# Patient Record
Sex: MALE
[De-identification: names, ages, dates, MRNs, and addresses within clinical notes are randomized per-mention and may not be internally consistent; named-entity substitution may affect disease eponyms.]

---

## 2024-01-04 PROBLEM — Z00.00 ENCOUNTER FOR PREVENTIVE HEALTH EXAMINATION: Status: ACTIVE | Noted: 2024-01-04

## 2024-01-08 ENCOUNTER — APPOINTMENT (OUTPATIENT)
Dept: OTOLARYNGOLOGY | Facility: CLINIC | Age: 59
End: 2024-01-08
Payer: COMMERCIAL

## 2024-01-08 VITALS
DIASTOLIC BLOOD PRESSURE: 78 MMHG | SYSTOLIC BLOOD PRESSURE: 127 MMHG | BODY MASS INDEX: 35.46 KG/M2 | HEIGHT: 68 IN | TEMPERATURE: 97.3 F | WEIGHT: 234 LBS | HEART RATE: 66 BPM

## 2024-01-08 DIAGNOSIS — K21.9 GASTRO-ESOPHAGEAL REFLUX DISEASE W/OUT ESOPHAGITIS: ICD-10-CM

## 2024-01-08 DIAGNOSIS — Z86.79 PERSONAL HISTORY OF OTHER DISEASES OF THE CIRCULATORY SYSTEM: ICD-10-CM

## 2024-01-08 DIAGNOSIS — Z78.9 OTHER SPECIFIED HEALTH STATUS: ICD-10-CM

## 2024-01-08 DIAGNOSIS — R49.0 DYSPHONIA: ICD-10-CM

## 2024-01-08 DIAGNOSIS — R05.3 CHRONIC COUGH: ICD-10-CM

## 2024-01-08 PROCEDURE — 99205 OFFICE O/P NEW HI 60 MIN: CPT | Mod: 25

## 2024-01-08 PROCEDURE — 70371 SPEECH EVALUATION COMPLEX: CPT | Mod: 59

## 2024-01-08 PROCEDURE — 31579 LARYNGOSCOPY TELESCOPIC: CPT

## 2024-01-08 RX ORDER — FAMOTIDINE 20 MG/1
20 TABLET, FILM COATED ORAL
Qty: 30 | Refills: 3 | Status: ACTIVE | COMMUNITY
Start: 2024-01-08 | End: 1900-01-01

## 2024-01-08 RX ORDER — BENZONATATE 100 MG/1
100 CAPSULE ORAL 3 TIMES DAILY
Qty: 30 | Refills: 0 | Status: ACTIVE | COMMUNITY
Start: 2024-01-08 | End: 1900-01-01

## 2024-01-08 RX ORDER — OMEPRAZOLE 40 MG/1
40 CAPSULE, DELAYED RELEASE ORAL
Qty: 30 | Refills: 3 | Status: ACTIVE | COMMUNITY
Start: 2024-01-08 | End: 1900-01-01

## 2024-01-08 NOTE — PHYSICAL EXAM
[FreeTextEntry1] : Intermittent coughing fits, gravelly quality to voice with decreased clarity [Normal] : mucosa is normal [Midline] : trachea located in midline position

## 2024-01-08 NOTE — ASSESSMENT
[FreeTextEntry1] : - 1/8/2024 57-year-old gentleman who presents with concern for chronic cough. Based on history and physical exam, I do believe there is a component of laryngopharyngeal reflux.  At this time I am recommending dietary and behavioral change to reduce acid in the diet.  I am also recommending omeprazole as well famotidine for a 3 months trial.  We also had a lengthy discussion regarding cough avoidance as well as increased hydration and sips of water throughout the day.  Will also prescribe Tessalon Perles to help start this process over the next 2 weeks.  In terms of dysphonia there was evidence of supraglottic compression.  I believe that he would also benefit from consultation with our speech pathology team.  -Tessalon Perles -Consultation with speech pathology - Dietary and behavioral modification to reduce acid reflux, handout given - Omeprazole qd as well as Famotidine qhs - Voice hygiene, increase hydration, sips of water throughout the day, avoid throat clearing - fu 3 mo

## 2024-01-08 NOTE — PROCEDURE
[de-identified] : - Procedure Note   Pre-operative Diagnosis: Dysphonia, Throat discomfort Post-operative Diagnosis:  laryngopharyngeal reflux Anesthesia: Topical - 1 % Lidocaine/Phenylephrine Procedure:  Flexible Laryngoscopy with Stroboscopy - [facility] [id number]   Procedure Details:  The patient was placed in the sitting position.  After decongestant and anesthesia were applied the laryngoscope was passed.  The nasal cavities, nasopharynx, oropharynx, hypopharynx, and larynx were all examined.  Vocal folds were examined during respiration and phonation.  The following findings were noted:   Findings:  Nose: Septum is midline, turbinates are normal, nasal airways patent, mucosa normal Nasopharynx: Adenoids normal, no masses, eustachian tube normal Oropharynx: Pharyngeal walls symmetric and without lesion. Tonsils/fossae symmetric Hypopharynx: Hypopharynx and pyriform sinuses without lesion. No masses or asymmetry.  No pooling of secretions. Larynx:  Epiglottis and aryepiglottic folds were sharp and crisp bilaterally.  Bilateral false vocal folds normal appearance. Airway was widely patent.  + postcricoid edema,  erythema of the vocal process   Strobe Exam Ratings    TVF Appearance: normal,  mild erythema of the vocal process TVF Mobility: normal Edema/hypertrophy: normal,  + postcricoid edema Mucus on TVF: normal Glottic Closure: normal/adequate Mucosal Wave: normal Amplitude of Vibration: normal Phase: symmetric Supraglottic Hyperfunction: Significant supraglottic compression Other Findings: none   Condition: Stable.  Patient tolerated procedure well.   Complications: None   --------------------------------------------------------------------   Procedure: Pharyngeal and speech evaluation, by cine or video - CPT 74313  Voice assessment was recorded via video recording on this date.   Clarity: Hoarse and gravelly Range: Reduced Pitch Control: Reduced Projection: Reduced Tremor: None Cough: +/Dry   Condition: Stable.  Patient tolerated procedure well. Complications: None

## 2024-01-08 NOTE — HISTORY OF PRESENT ILLNESS
[de-identified] : 1/8/24 58 yo male who presents with concern for chronic cough.  This has been present x several years.  This is every day all day.  Will wake him up from sleep.  These are coughing fits.  It is a dry cough.  no issues chewing, eating or swallowing foods.  No voice changes.  No breathing issues.  No exercise.  No issues walking up stairs.  Strong smells like cologne, grass or smoke do not start the coughing.  Occasionally will have some neck pain.  Was giving a PCN and prednisone.  OTC cough medicine.  Diet: + 3-4 cups caffiene daily, tamote, garlic, onion, citrus, blueberries, carbonated - chocoalte,  no alocohol drinks 1-2 bottles of water daily No dx o reflux. No hx of asthma,  tested and found ot be negative.

## 2024-02-26 ENCOUNTER — APPOINTMENT (OUTPATIENT)
Dept: OTOLARYNGOLOGY | Facility: CLINIC | Age: 59
End: 2024-02-26

## 2024-04-08 ENCOUNTER — APPOINTMENT (OUTPATIENT)
Dept: OTOLARYNGOLOGY | Facility: CLINIC | Age: 59
End: 2024-04-08

## 2024-04-10 NOTE — ASSESSMENT
[FreeTextEntry1] : - 1/8/2024 57-year-old gentleman who presents with concern for chronic cough. Based on history and physical exam, I do believe there is a component of laryngopharyngeal reflux.  At this time I am recommending dietary and behavioral change to reduce acid in the diet.  I am also recommending omeprazole as well famotidine for a 3 months trial.  We also had a lengthy discussion regarding cough avoidance as well as increased hydration and sips of water throughout the day.  Will also prescribe Tessalon Perles to help start this process over the next 2 weeks.  In terms of dysphonia there was evidence of supraglottic compression.  I believe that he would also benefit from consultation with our speech pathology team.  4/8/24   -Tessalon Perles -Consultation with speech pathology - Dietary and behavioral modification to reduce acid reflux, handout given - Omeprazole qd as well as Famotidine qhs - Voice hygiene, increase hydration, sips of water throughout the day, avoid throat clearing - fu 3 mo

## 2024-04-10 NOTE — HISTORY OF PRESENT ILLNESS
[de-identified] : 1/8/24 56 yo male who presents with concern for chronic cough.  This has been present x several years.  This is every day all day.  Will wake him up from sleep.  These are coughing fits.  It is a dry cough.  no issues chewing, eating or swallowing foods.  No voice changes.  No breathing issues.  No exercise.  No issues walking up stairs.  Strong smells like cologne, grass or smoke do not start the coughing.  Occasionally will have some neck pain.  Was giving a PCN and prednisone.  OTC cough medicine.  Diet: + 3-4 cups caffiene daily, tamote, garlic, onion, citrus, blueberries, carbonated - chocoalte,  no alocohol drinks 1-2 bottles of water daily No dx o reflux. No hx of asthma,  tested and found ot be negative. - [FreeTextEntry1] : 4/8/24

## 2024-04-10 NOTE — PROCEDURE
[de-identified] : - Procedure Note   Pre-operative Diagnosis: Dysphonia, Throat discomfort Post-operative Diagnosis:  laryngopharyngeal reflux Anesthesia: Topical - 1 % Lidocaine/Phenylephrine Procedure:  Flexible Laryngoscopy with Stroboscopy - [place] [id number]   Procedure Details:  The patient was placed in the sitting position.  After decongestant and anesthesia were applied the laryngoscope was passed.  The nasal cavities, nasopharynx, oropharynx, hypopharynx, and larynx were all examined.  Vocal folds were examined during respiration and phonation.  The following findings were noted:   Findings:  Nose: Septum is midline, turbinates are normal, nasal airways patent, mucosa normal Nasopharynx: Adenoids normal, no masses, eustachian tube normal Oropharynx: Pharyngeal walls symmetric and without lesion. Tonsils/fossae symmetric Hypopharynx: Hypopharynx and pyriform sinuses without lesion. No masses or asymmetry.  No pooling of secretions. Larynx:  Epiglottis and aryepiglottic folds were sharp and crisp bilaterally.  Bilateral false vocal folds normal appearance. Airway was widely patent.  + postcricoid edema,  erythema of the vocal process   Strobe Exam Ratings    TVF Appearance: normal,  mild erythema of the vocal process TVF Mobility: normal Edema/hypertrophy: normal,  + postcricoid edema Mucus on TVF: normal Glottic Closure: normal/adequate Mucosal Wave: normal Amplitude of Vibration: normal Phase: symmetric Supraglottic Hyperfunction: Significant supraglottic compression Other Findings: none   Condition: Stable.  Patient tolerated procedure well.   Complications: None   --------------------------------------------------------------------   Procedure: Pharyngeal and speech evaluation, by cine or video - CPT 15169  Voice assessment was recorded via video recording on this date.   Clarity: Hoarse and gravelly Range: Reduced Pitch Control: Reduced Projection: Reduced Tremor: None Cough: +/Dry   Condition: Stable.  Patient tolerated procedure well. Complications: None

## 2024-04-10 NOTE — PHYSICAL EXAM
[Normal] : mucosa is normal [Midline] : trachea located in midline position [FreeTextEntry1] : Intermittent coughing fits, gravelly quality to voice with decreased clarity

## 2024-06-10 ENCOUNTER — APPOINTMENT (OUTPATIENT)
Dept: OTOLARYNGOLOGY | Facility: CLINIC | Age: 59
End: 2024-06-10

## 2024-08-09 ENCOUNTER — RX RENEWAL (OUTPATIENT)
Age: 59
End: 2024-08-09

## 2024-10-25 ENCOUNTER — NON-APPOINTMENT (OUTPATIENT)
Age: 59
End: 2024-10-25

## 2024-10-25 ENCOUNTER — APPOINTMENT (OUTPATIENT)
Dept: OTOLARYNGOLOGY | Facility: CLINIC | Age: 59
End: 2024-10-25
Payer: COMMERCIAL

## 2024-10-25 VITALS
SYSTOLIC BLOOD PRESSURE: 144 MMHG | TEMPERATURE: 97.7 F | HEIGHT: 68 IN | HEART RATE: 50 BPM | BODY MASS INDEX: 35.46 KG/M2 | OXYGEN SATURATION: 98 % | WEIGHT: 234 LBS | DIASTOLIC BLOOD PRESSURE: 78 MMHG

## 2024-10-25 DIAGNOSIS — R49.0 DYSPHONIA: ICD-10-CM

## 2024-10-25 DIAGNOSIS — R05.3 CHRONIC COUGH: ICD-10-CM

## 2024-10-25 DIAGNOSIS — K21.9 GASTRO-ESOPHAGEAL REFLUX DISEASE W/OUT ESOPHAGITIS: ICD-10-CM

## 2024-10-25 PROCEDURE — 31579 LARYNGOSCOPY TELESCOPIC: CPT

## 2024-10-25 PROCEDURE — 99215 OFFICE O/P EST HI 40 MIN: CPT | Mod: 25

## 2025-01-24 ENCOUNTER — APPOINTMENT (OUTPATIENT)
Dept: OTOLARYNGOLOGY | Facility: CLINIC | Age: 60
End: 2025-01-24
Payer: COMMERCIAL

## 2025-01-24 ENCOUNTER — NON-APPOINTMENT (OUTPATIENT)
Age: 60
End: 2025-01-24

## 2025-01-24 VITALS
OXYGEN SATURATION: 98 % | HEART RATE: 68 BPM | WEIGHT: 234 LBS | BODY MASS INDEX: 35.46 KG/M2 | SYSTOLIC BLOOD PRESSURE: 150 MMHG | DIASTOLIC BLOOD PRESSURE: 80 MMHG | HEIGHT: 68 IN | TEMPERATURE: 98.4 F

## 2025-01-24 DIAGNOSIS — K21.9 GASTRO-ESOPHAGEAL REFLUX DISEASE W/OUT ESOPHAGITIS: ICD-10-CM

## 2025-01-24 DIAGNOSIS — R05.3 CHRONIC COUGH: ICD-10-CM

## 2025-01-24 DIAGNOSIS — R49.0 DYSPHONIA: ICD-10-CM

## 2025-01-24 PROCEDURE — 31579 LARYNGOSCOPY TELESCOPIC: CPT

## 2025-01-24 PROCEDURE — 99215 OFFICE O/P EST HI 40 MIN: CPT | Mod: 25

## 2025-02-03 ENCOUNTER — APPOINTMENT (OUTPATIENT)
Dept: OTOLARYNGOLOGY | Facility: CLINIC | Age: 60
End: 2025-02-03

## 2025-02-03 PROCEDURE — 92524 BEHAVRAL QUALIT ANALYS VOICE: CPT | Mod: GN

## 2025-02-03 PROCEDURE — 92507 TX SP LANG VOICE COMM INDIV: CPT | Mod: GN

## 2025-02-06 ENCOUNTER — APPOINTMENT (OUTPATIENT)
Dept: OTOLARYNGOLOGY | Facility: CLINIC | Age: 60
End: 2025-02-06
Payer: COMMERCIAL

## 2025-02-06 VITALS
TEMPERATURE: 96.2 F | HEIGHT: 68 IN | BODY MASS INDEX: 35.46 KG/M2 | HEART RATE: 74 BPM | WEIGHT: 234 LBS | SYSTOLIC BLOOD PRESSURE: 132 MMHG | DIASTOLIC BLOOD PRESSURE: 86 MMHG | OXYGEN SATURATION: 98 %

## 2025-02-06 DIAGNOSIS — R49.0 DYSPHONIA: ICD-10-CM

## 2025-02-06 DIAGNOSIS — K21.9 GASTRO-ESOPHAGEAL REFLUX DISEASE W/OUT ESOPHAGITIS: ICD-10-CM

## 2025-02-06 DIAGNOSIS — R05.3 CHRONIC COUGH: ICD-10-CM

## 2025-02-06 PROCEDURE — 31579 LARYNGOSCOPY TELESCOPIC: CPT

## 2025-02-06 PROCEDURE — 99215 OFFICE O/P EST HI 40 MIN: CPT | Mod: 25

## 2025-02-21 ENCOUNTER — APPOINTMENT (OUTPATIENT)
Dept: OTOLARYNGOLOGY | Facility: CLINIC | Age: 60
End: 2025-02-21
Payer: COMMERCIAL

## 2025-02-21 VITALS
OXYGEN SATURATION: 99 % | DIASTOLIC BLOOD PRESSURE: 90 MMHG | HEART RATE: 77 BPM | SYSTOLIC BLOOD PRESSURE: 147 MMHG | TEMPERATURE: 97.8 F

## 2025-02-21 DIAGNOSIS — K21.9 GASTRO-ESOPHAGEAL REFLUX DISEASE W/OUT ESOPHAGITIS: ICD-10-CM

## 2025-02-21 DIAGNOSIS — R05.3 CHRONIC COUGH: ICD-10-CM

## 2025-02-21 DIAGNOSIS — R49.0 DYSPHONIA: ICD-10-CM

## 2025-02-21 PROCEDURE — 31579 LARYNGOSCOPY TELESCOPIC: CPT

## 2025-02-21 PROCEDURE — 99215 OFFICE O/P EST HI 40 MIN: CPT | Mod: 25
